# Patient Record
Sex: MALE | Race: BLACK OR AFRICAN AMERICAN
[De-identification: names, ages, dates, MRNs, and addresses within clinical notes are randomized per-mention and may not be internally consistent; named-entity substitution may affect disease eponyms.]

---

## 2021-05-09 ENCOUNTER — HOSPITAL ENCOUNTER (INPATIENT)
Dept: HOSPITAL 92 - ERS | Age: 46
LOS: 3 days | Discharge: HOME | DRG: 69 | End: 2021-05-12
Attending: INTERNAL MEDICINE | Admitting: INTERNAL MEDICINE
Payer: COMMERCIAL

## 2021-05-09 VITALS — BODY MASS INDEX: 33.2 KG/M2

## 2021-05-09 DIAGNOSIS — R29.810: ICD-10-CM

## 2021-05-09 DIAGNOSIS — I10: ICD-10-CM

## 2021-05-09 DIAGNOSIS — G40.409: ICD-10-CM

## 2021-05-09 DIAGNOSIS — R47.1: ICD-10-CM

## 2021-05-09 DIAGNOSIS — G89.29: ICD-10-CM

## 2021-05-09 DIAGNOSIS — Z20.822: ICD-10-CM

## 2021-05-09 DIAGNOSIS — G81.91: ICD-10-CM

## 2021-05-09 DIAGNOSIS — E78.5: ICD-10-CM

## 2021-05-09 DIAGNOSIS — E66.9: ICD-10-CM

## 2021-05-09 DIAGNOSIS — R47.81: ICD-10-CM

## 2021-05-09 DIAGNOSIS — Z79.899: ICD-10-CM

## 2021-05-09 DIAGNOSIS — G45.9: Primary | ICD-10-CM

## 2021-05-09 LAB
ALBUMIN SERPL BCG-MCNC: 4.2 G/DL (ref 3.5–5)
ALP SERPL-CCNC: 61 U/L (ref 40–110)
ALT SERPL W P-5'-P-CCNC: 21 U/L (ref 8–55)
ANION GAP SERPL CALC-SCNC: 23 MMOL/L (ref 10–20)
APTT PPP: 28.1 SEC (ref 22.9–36.1)
AST SERPL-CCNC: 32 U/L (ref 5–34)
BILIRUB SERPL-MCNC: 0.3 MG/DL (ref 0.2–1.2)
BUN SERPL-MCNC: 15 MG/DL (ref 8.9–20.6)
CALCIUM SERPL-MCNC: 8.9 MG/DL (ref 7.8–10.44)
CHLORIDE SERPL-SCNC: 106 MMOL/L (ref 98–107)
CO2 SERPL-SCNC: 12 MMOL/L (ref 22–29)
CREAT CL PREDICTED SERPL C-G-VRATE: 0 ML/MIN (ref 70–130)
GLOBULIN SER CALC-MCNC: 3.6 G/DL (ref 2.4–3.5)
GLUCOSE SERPL-MCNC: 99 MG/DL (ref 70–105)
HGB BLD-MCNC: 13.9 G/DL (ref 14–18)
INR PPP: 1
MCH RBC QN AUTO: 31.3 PG (ref 27–31)
MCV RBC AUTO: 91.1 FL (ref 78–98)
MDIFF COMPLETE?: YES
PLATELET # BLD AUTO: 183 THOU/UL (ref 130–400)
POTASSIUM SERPL-SCNC: 4.6 MMOL/L (ref 3.5–5.1)
PROTHROMBIN TIME: 13.5 SEC (ref 12–14.7)
RBC # BLD AUTO: 4.44 MILL/UL (ref 4.7–6.1)
SODIUM SERPL-SCNC: 136 MMOL/L (ref 136–145)
WBC # BLD AUTO: 8.6 THOU/UL (ref 4.8–10.8)

## 2021-05-09 PROCEDURE — 85300 ANTITHROMBIN III ACTIVITY: CPT

## 2021-05-09 PROCEDURE — 86038 ANTINUCLEAR ANTIBODIES: CPT

## 2021-05-09 PROCEDURE — 95957 EEG DIGITAL ANALYSIS: CPT

## 2021-05-09 PROCEDURE — 85598 HEXAGNAL PHOSPH PLTLT NEUTRL: CPT

## 2021-05-09 PROCEDURE — 70498 CT ANGIOGRAPHY NECK: CPT

## 2021-05-09 PROCEDURE — U0002 COVID-19 LAB TEST NON-CDC: HCPCS

## 2021-05-09 PROCEDURE — 70551 MRI BRAIN STEM W/O DYE: CPT

## 2021-05-09 PROCEDURE — 85379 FIBRIN DEGRADATION QUANT: CPT

## 2021-05-09 PROCEDURE — 71045 X-RAY EXAM CHEST 1 VIEW: CPT

## 2021-05-09 PROCEDURE — 80053 COMPREHEN METABOLIC PANEL: CPT

## 2021-05-09 PROCEDURE — 86147 CARDIOLIPIN ANTIBODY EA IG: CPT

## 2021-05-09 PROCEDURE — 83090 ASSAY OF HOMOCYSTEINE: CPT

## 2021-05-09 PROCEDURE — 95819 EEG AWAKE AND ASLEEP: CPT

## 2021-05-09 PROCEDURE — 85730 THROMBOPLASTIN TIME PARTIAL: CPT

## 2021-05-09 PROCEDURE — 85307 ASSAY ACTIVATED PROTEIN C: CPT

## 2021-05-09 PROCEDURE — 93005 ELECTROCARDIOGRAM TRACING: CPT

## 2021-05-09 PROCEDURE — 36415 COLL VENOUS BLD VENIPUNCTURE: CPT

## 2021-05-09 PROCEDURE — 95712 VEEG 2-12 HR INTMT MNTR: CPT

## 2021-05-09 PROCEDURE — 96374 THER/PROPH/DIAG INJ IV PUSH: CPT

## 2021-05-09 PROCEDURE — 85610 PROTHROMBIN TIME: CPT

## 2021-05-09 PROCEDURE — 93306 TTE W/DOPPLER COMPLETE: CPT

## 2021-05-09 PROCEDURE — 80048 BASIC METABOLIC PNL TOTAL CA: CPT

## 2021-05-09 PROCEDURE — 80061 LIPID PANEL: CPT

## 2021-05-09 PROCEDURE — 86780 TREPONEMA PALLIDUM: CPT

## 2021-05-09 PROCEDURE — 80306 DRUG TEST PRSMV INSTRMNT: CPT

## 2021-05-09 PROCEDURE — 85305 CLOT INHIBIT PROT S TOTAL: CPT

## 2021-05-09 PROCEDURE — 85240 CLOT FACTOR VIII AHG 1 STAGE: CPT

## 2021-05-09 PROCEDURE — 70450 CT HEAD/BRAIN W/O DYE: CPT

## 2021-05-09 PROCEDURE — 85025 COMPLETE CBC W/AUTO DIFF WBC: CPT

## 2021-05-09 PROCEDURE — 85303 CLOT INHIBIT PROT C ACTIVITY: CPT

## 2021-05-09 PROCEDURE — 70496 CT ANGIOGRAPHY HEAD: CPT

## 2021-05-09 PROCEDURE — 36416 COLLJ CAPILLARY BLOOD SPEC: CPT

## 2021-05-09 PROCEDURE — 86225 DNA ANTIBODY NATIVE: CPT

## 2021-05-09 PROCEDURE — 85652 RBC SED RATE AUTOMATED: CPT

## 2021-05-10 LAB
ANION GAP SERPL CALC-SCNC: 13 MMOL/L (ref 10–20)
APTT PPP: 30.9 SEC (ref 22.9–36.1)
BASOPHILS # BLD AUTO: 0.1 THOU/UL (ref 0–0.2)
BASOPHILS NFR BLD AUTO: 0.8 % (ref 0–1)
BUN SERPL-MCNC: 13 MG/DL (ref 8.9–20.6)
CALCIUM SERPL-MCNC: 9.3 MG/DL (ref 7.8–10.44)
CHD RISK SERPL-RTO: 3.1 (ref ?–4.5)
CHLORIDE SERPL-SCNC: 102 MMOL/L (ref 98–107)
CHOLEST SERPL-MCNC: 132 MG/DL
CO2 SERPL-SCNC: 22 MMOL/L (ref 22–29)
CREAT CL PREDICTED SERPL C-G-VRATE: 152 ML/MIN (ref 70–130)
D DIMER PPP FEU-MCNC: 0.36 *MCG/ML (ref 0.27–0.43)
DRUG SCREEN CUTOFF: (no result)
EOSINOPHIL # BLD AUTO: 0 THOU/UL (ref 0–0.7)
EOSINOPHIL NFR BLD AUTO: 0.3 % (ref 0–10)
GLUCOSE SERPL-MCNC: 123 MG/DL (ref 70–105)
HDLC SERPL-MCNC: 43 MG/DL
HGB BLD-MCNC: 13.8 G/DL (ref 14–18)
INR PPP: 1
INR PPP: 1
LDLC SERPL CALC-MCNC: 76 MG/DL
LYMPHOCYTES # BLD: 2.3 THOU/UL (ref 1.2–3.4)
LYMPHOCYTES NFR BLD AUTO: 21.8 % (ref 21–51)
MCH RBC QN AUTO: 31 PG (ref 27–31)
MCV RBC AUTO: 89.5 FL (ref 78–98)
MEDTOX CONTROL LINE VALID?: (no result)
MEDTOX READER #: (no result)
MONOCYTES # BLD AUTO: 0.6 THOU/UL (ref 0.11–0.59)
MONOCYTES NFR BLD AUTO: 5.2 % (ref 0–10)
NEUTROPHILS # BLD AUTO: 7.6 THOU/UL (ref 1.4–6.5)
NEUTROPHILS NFR BLD AUTO: 71.9 % (ref 42–75)
PLATELET # BLD AUTO: 191 THOU/UL (ref 130–400)
POTASSIUM SERPL-SCNC: 3.7 MMOL/L (ref 3.5–5.1)
PROTHROMBIN TIME: 13.4 SEC (ref 12–14.7)
PROTHROMBIN TIME: 13.5 SEC (ref 12–14.7)
RBC # BLD AUTO: 4.46 MILL/UL (ref 4.7–6.1)
SODIUM SERPL-SCNC: 133 MMOL/L (ref 136–145)
SYPHILIS ANTIBODY INDEX: 0.04 S/CO
TRIGL SERPL-MCNC: 65 MG/DL (ref ?–150)
WBC # BLD AUTO: 10.5 THOU/UL (ref 4.8–10.8)

## 2021-05-10 RX ADMIN — LEVETIRACETAM SCH MLS: 10 INJECTION INTRAVENOUS at 11:50

## 2021-05-10 RX ADMIN — LEVETIRACETAM SCH MLS: 10 INJECTION INTRAVENOUS at 19:52

## 2021-05-11 VITALS — DIASTOLIC BLOOD PRESSURE: 69 MMHG | SYSTOLIC BLOOD PRESSURE: 126 MMHG

## 2021-05-11 RX ADMIN — LEVETIRACETAM SCH MLS: 10 INJECTION INTRAVENOUS at 08:32

## 2021-05-11 RX ADMIN — LEVETIRACETAM SCH MLS: 10 INJECTION INTRAVENOUS at 21:52

## 2021-05-12 VITALS — TEMPERATURE: 97.8 F

## 2021-05-12 RX ADMIN — LEVETIRACETAM SCH MLS: 10 INJECTION INTRAVENOUS at 10:03

## 2021-05-12 RX ADMIN — LEVETIRACETAM SCH: 10 INJECTION INTRAVENOUS at 09:53

## 2021-05-13 LAB
AT III ACT/NOR PPP CHRO: 105 % ACTIVE (ref 85–130)
FACT VIII ACT/NOR PPP: 169.5 % ACTIVE (ref 56–157)

## 2021-05-14 LAB
ANA SYMPHONY (QUANTITATIVE): 0.2 RATIO
APTT HEX PL PPP: 2.1 SEC (ref 0–8)
DSDNA IGG SERPL IA-ACNC: 0.7 IU/ML
DSDNA INTERPRETATION: (no result)
ELIA APS NEW METHOD: (no result)
ELIA CARD IGA 14-20 INTERP: (no result)
ELIA CARD IGG/M 10-40 INTERP: (no result)
HEX PHOS LA TUBE 1: 43.5 SEC
HEX PHOS LA TUBE 2: 41.5 SEC

## 2023-01-19 ENCOUNTER — HOSPITAL ENCOUNTER (OUTPATIENT)
Dept: TELEMEDICINE | Facility: HOSPITAL | Age: 48
Discharge: HOME | End: 2023-01-19
Payer: MEDICARE

## 2023-01-19 PROCEDURE — G0425 INPT/ED TELECONSULT30: HCPCS | Mod: 95,,, | Performed by: PSYCHIATRY & NEUROLOGY

## 2023-01-19 PROCEDURE — G0425 PR INPT TELEHEALTH CONSULT 30M: ICD-10-PCS | Mod: 95,,, | Performed by: PSYCHIATRY & NEUROLOGY

## 2023-01-19 NOTE — SUBJECTIVE & OBJECTIVE
Woke up with symptoms?: yes    Recent bleeding noted: no  Does the patient take any Blood Thinners? no  Medications: Unknown      Past Medical History: stroke and seizures    Past Surgical History: no major surgeries within the last 2 weeks    Family History: no relevant history    Social History: unable to obtain    Allergies: Allergies have not been reviewed     Review of Systems   Constitutional: Negative for chills, diaphoresis and fever.   HENT: Negative for hearing loss, tinnitus and trouble swallowing.    Eyes: Negative for visual disturbance.   Respiratory: Negative for shortness of breath.    Cardiovascular: Negative for chest pain and palpitations.   Gastrointestinal: Negative for blood in stool and vomiting.   Endocrine: Negative for cold intolerance.   Genitourinary: Negative for hematuria.   Musculoskeletal: Negative for neck pain.   Allergic/Immunologic: Negative for immunocompromised state.   Neurological: Positive for facial asymmetry, speech difficulty and weakness. Negative for dizziness and numbness.   Hematological: Does not bruise/bleed easily.   Psychiatric/Behavioral: Positive for confusion. Negative for agitation and behavioral problems.     Objective:   Vitals: There were no vitals taken for this visit.     CT READ: Yes  No hemmorhage. No mass effect. No early infarct signs.     Physical Exam  Vitals and nursing note reviewed.   Constitutional:       General: He is not in acute distress.     Appearance: Normal appearance. He is obese.   HENT:      Head: Normocephalic and atraumatic.      Nose: Nose normal.   Eyes:      Extraocular Movements: Extraocular movements intact.   Cardiovascular:      Rate and Rhythm: Normal rate and regular rhythm.   Pulmonary:      Effort: No respiratory distress.   Abdominal:      General: There is no distension.   Genitourinary:     Comments: na  Musculoskeletal:      Cervical back: Normal range of motion.      Right lower leg: No edema.      Left lower leg:  No edema.   Skin:     Findings: No erythema or rash.   Neurological:      Mental Status: He is alert. He is disoriented.      Cranial Nerves: Cranial nerve deficit present.      Sensory: No sensory deficit.      Motor: No weakness.      Coordination: Coordination normal.   Psychiatric:         Mood and Affect: Mood normal.         Behavior: Behavior normal.

## 2023-01-19 NOTE — CONSULTS
[facility]  Vascular Neurology  Comprehensive Stroke Center  TeleVascular Neurology Acute Consultation Note      Consults    Consulting Provider: WILLIAMS MCARTHUR III  Current Providers  No providers found    Patient Location: [facility] Emergency Department  [facility] hospital nurse at bedside with patient assisting consultant.     Patient information was obtained from patient, past medical records, and primary team.         Assessment/Plan:   46 y/o with obesity, large L KO territory infarct couple years ago with residual R sided weakness and seizures, brought in due to acute onset difficulty speaking.  NIHSS 5, CT head without acute abnormality.  Concern for acute L MCA territory infarct. Out of the window for treatment with TNK.  Recommended STAT CTA brain and neck searching for LVO and potential eligibility for EVT.  If no LVO found, stay at [facility] facility and complete stroke work up with MRI brain, TTE, lipid panel, hemoglobin A1c.  Neurology, PT/OT and speech therapy consults.    Diagnoses: acute expressive aphasia.  No new Assessment & Plan notes have been filed under this hospital service since the last note was generated.  Service: Vascular Neurology      STROKE DOCUMENTATION     Acute Stroke Times:   Acute Stroke Times   Last Known Normal Date: 01/18/23  Unknown Normal Time: Unknown Time  Symptom Onset Date: 01/19/23  Unknown Symptom Onset Time: Unknown Time  Stroke Team Called Date: 01/19/23  Stroke Team Called Time: 1305  Stroke Team Arrival Date: 01/19/23  Stroke Team Arrival Time: 1315  CT Interpretation Time: 1315  Thrombolytic Therapy Recommended: No  Thrombectomy Recommended:  (Pending CTA)    NIH Scale:  Interval: baseline  1a. Level of Consciousness: 0-->Alert, keenly responsive  1b. LOC Questions: 1-->Answers one question correctly  1c. LOC Commands: 0-->Performs both tasks correctly  2. Best Gaze:  "0-->Normal  3. Visual: 0-->No visual loss  4. Facial Palsy: 2-->Partial paralysis (total or near-total paralysis of lower face)  5a. Motor Arm, Left: 0-->No drift, limb holds 90 (or 45) degrees for full 10 secs  5b. Motor Arm, Right: 0-->No drift, limb holds 90 (or 45) degrees for full 10 secs  6a. Motor Leg, Left: 0-->No drift, leg holds 30 degree position for full 5 secs  6b. Motor Leg, Right: 0-->No drift, leg holds 30 degree position for full 5 secs  7. Limb Ataxia: 0-->Absent  8. Sensory: 0-->Normal, no sensory loss  9. Best Language: 2-->Severe aphasia, all communication is through fragmentary expression, great need for inference, questioning, and guessing by the listener. Range of information that can be exchanged is limited, listener carries burden of. . . (see row details)  10. Dysarthria: 0-->Normal  11. Extinction and Inattention (formerly Neglect): 0-->No abnormality  Total (NIH Stroke Scale): 5     Modified [place] Score: 2  [place] Coma Scale:14   ABCD2 Score:    [id number] Score:   HAS -BLED Score:   ICH Score:   Hunt & Alvares Classification:       There were no vitals taken for this visit.  Eligible for thrombolytic therapy?: No  Thrombolytic therapy recomended: Thrombolytic therapy not recommended due to Outside of treatment window   Possible Interventional Revascularization Candidate? Awaiting CTA results from [facility] for determination     Disposition Recommendation: pending further studies    Subjective:     History of Present Illness: 48 y/o with obesity, large L KO territory infarct couple years ago with residual R sided weakness and seizures, brought in due to acute onset difficulty speaking. Apparently never had similar symptoms before.  Said that he was fine when he went to bed last night, woke up having " some issues talking", visited a friend and sustained a fall and worsening of his ability to talk and ambulance was called.  Some improvement.        No notes on file      Woke up with " symptoms?: yes    Recent bleeding noted: no  Does the patient take any Blood Thinners? no  Medications: Unknown      Past Medical History: stroke and seizures    Past Surgical History: no major surgeries within the last 2 weeks    Family History: no relevant history    Social History: unable to obtain    Allergies: Allergies have not been reviewed     Review of Systems   Constitutional: Negative for chills, diaphoresis and fever.   HENT: Negative for hearing loss, tinnitus and trouble swallowing.    Eyes: Negative for visual disturbance.   Respiratory: Negative for shortness of breath.    Cardiovascular: Negative for chest pain and palpitations.   Gastrointestinal: Negative for blood in stool and vomiting.   Endocrine: Negative for cold intolerance.   Genitourinary: Negative for hematuria.   Musculoskeletal: Negative for neck pain.   Allergic/Immunologic: Negative for immunocompromised state.   Neurological: Positive for facial asymmetry, speech difficulty and weakness. Negative for dizziness and numbness.   Hematological: Does not bruise/bleed easily.   Psychiatric/Behavioral: Positive for confusion. Negative for agitation and behavioral problems.     Objective:   Vitals: There were no vitals taken for this visit.     CT READ: Yes  No hemmorhage. No mass effect. No early infarct signs.     Physical Exam  Vitals and nursing note reviewed.   Constitutional:       General: He is not in acute distress.     Appearance: Normal appearance. He is obese.   HENT:      Head: Normocephalic and atraumatic.      Nose: Nose normal.   Eyes:      Extraocular Movements: Extraocular movements intact.   Cardiovascular:      Rate and Rhythm: Normal rate and regular rhythm.   Pulmonary:      Effort: No respiratory distress.   Abdominal:      General: There is no distension.   Genitourinary:     Comments: na  Musculoskeletal:      Cervical back: Normal range of motion.      Right lower leg: No edema.      Left lower leg: No edema.    Skin:     Findings: No erythema or rash.   Neurological:      Mental Status: He is alert. He is disoriented.      Cranial Nerves: Cranial nerve deficit present.      Sensory: No sensory deficit.      Motor: No weakness.      Coordination: Coordination normal.   Psychiatric:         Mood and Affect: Mood normal.         Behavior: Behavior normal.             Recommended the emergency room physician to have a brief discussion with the patient and/or family if available regarding the  risks and benefits of treatment, and to briefly document the occurrence of that discussion in his clinical encounter note.     The attending portion of this evaluation, treatment, and documentation was performed per Cole Nava MD via audiovisual.    Billing code:  (non-intervention mild to moderate stroke, TIA, some mimics)      This patient has a critical neurological condition/illness, with some potential for high morbidity and mortality.  There is a moderate probability for acute neurological change leading to clinical and possibly life-threatening deterioration requiring highest level of physician preparedness for urgent intervention.  Care was coordinated with other physicians involved in the patient's care.  Radiologic studies and laboratory data were reviewed and interpreted, and plan of care was re-assessed based on the results.  Diagnosis, treatment options and prognosis may have been discussed with the patient and/or family members or caregiver.    In your opinion, this was a: Tier 2 Van Positive    Consult End Time: 1:28 PM     Cole Nava MD  [facility]  Vascular Neurology   [facility]

## 2023-09-01 ENCOUNTER — HOSPITAL ENCOUNTER (OUTPATIENT)
Dept: TELEMEDICINE | Facility: HOSPITAL | Age: 48
Discharge: HOME | End: 2023-09-01
Payer: MEDICARE

## 2023-09-01 DIAGNOSIS — F80.81 STUTTERING: ICD-10-CM

## 2023-09-01 PROBLEM — R42 DIZZINESS: Status: ACTIVE | Noted: 2023-09-01

## 2023-09-01 PROCEDURE — G0426 PR INPT TELEHEALTH CONSULT 50M: ICD-10-PCS | Mod: 95,,, | Performed by: PSYCHIATRY & NEUROLOGY

## 2023-09-01 PROCEDURE — G0426 INPT/ED TELECONSULT50: HCPCS | Mod: 95,,, | Performed by: PSYCHIATRY & NEUROLOGY

## 2023-09-01 NOTE — ASSESSMENT & PLAN NOTE
Mild reduction in fluency with stuttering on most verbal output however it is 100% completed and 100% accurate. No other signs of speech dysfunction noted. Chronic R LE weakness noted as well from remote left KO stroke.  Patient already taking asa/plavix and would continue those.  Given the isolated stuttering which may represent an mild aphasia, the symptoms are mild and nondisabling and thus would not warrant the risk of thrombolytic therapy at this time. DDx includes recrudescence.  Recommend:  - CTA H&N (or MRA H&N) for vessel imaging ASAP  to r/o high risk attributable lesion for further risk stratification and mgmt  - MRI brain w/o contrast to evaluate for presence and characterization of infarct  - TTE (if < 60 should add bubble study)  - lipid profile and A1c for evaluation of modifiable risk factors  - PT/OT/SLP eval and Rx  - Permissive HTN < 220/120 mmHg x48-72h pending results of vessel imaging

## 2023-09-01 NOTE — CONSULTS
[facility]  Vascular Neurology  Comprehensive Stroke Center  TeleVascular Neurology Acute Consultation Note      Consults    Consulting Provider: JOHNNY LUIS  Current Providers  No providers found    Patient Location: [facility] Emergency Department  [facility] hospital nurse at bedside with patient assisting consultant.     Patient information was obtained from patient.         Assessment/Plan:       Diagnoses:   Neuro  Stuttering  Mild reduction in fluency with stuttering on most verbal output however it is 100% completed and 100% accurate. No other signs of speech dysfunction noted. Chronic R LE weakness noted as well from remote left KO stroke.  Patient already taking asa/plavix and would continue those.  Given the isolated stuttering which may represent an mild aphasia, the symptoms are mild and nondisabling and thus would not warrant the risk of thrombolytic therapy at this time. DDx includes recrudescence.  Recommend:  - CTA H&N (or MRA H&N) for vessel imaging ASAP  to r/o high risk attributable lesion for further risk stratification and mgmt  - MRI brain w/o contrast to evaluate for presence and characterization of infarct  - TTE (if < 60 should add bubble study)  - lipid profile and A1c for evaluation of modifiable risk factors  - PT/OT/SLP eval and Rx  - Permissive HTN < 220/120 mmHg x48-72h pending results of vessel imaging          STROKE DOCUMENTATION     Acute Stroke Times:   Acute Stroke Times   Last Known Normal Time: 1329  Stroke Team Called Time: 1348  Stroke Team Arrival Time: 1356  CT Interpretation Time: 1356    NIH Scale:  1a. Level of Consciousness: 0-->Alert, keenly responsive  1b. LOC Questions: 0-->Answers both questions correctly  1c. LOC Commands: 0-->Performs both tasks correctly  2. Best Gaze: 0-->Normal  3. Visual: 0-->No visual loss  4. Facial Palsy: 0-->Normal symmetrical movements  5a. Motor  Arm, Left: 0-->No drift, limb holds 90 (or 45) degrees for full 10 secs  5b. Motor Arm, Right: 1-->Drift, limb holds 90 (or 45) degrees, but drifts down before full 10 secs, does not hit bed or other support (baseline)  6a. Motor Leg, Left: 0-->No drift, leg holds 30 degree position for full 5 secs  6b. Motor Leg, Right: 0-->No drift, leg holds 30 degree position for full 5 secs  7. Limb Ataxia: 0-->Absent  8. Sensory: 0-->Normal, no sensory loss  9. Best Language: 1-->Mild-to-moderate aphasia, some obvious loss of fluency or facility of comprehension, without significant limitation on ideas expressed or form of expression. Reduction of speech and/or comprehension, however, makes conversation. . . (see row details)  10. Dysarthria: 0-->Normal  11. Extinction and Inattention (formerly Neglect): 0-->No abnormality  Total (NIH Stroke Scale): 2     Modified [place]    Corey Coma Scale:    ABCD2 Score:    [id number] Score:   HAS -BLED Score:   ICH Score:   Hunt & Alvares Classification:       There were no vitals taken for this visit.  Eligible for thrombolytic therapy?: No  Thrombolytic therapy recomended: Thrombolytic therapy not recommended due to Mild Non-Disabling Symptoms  Possible Interventional Revascularization Candidate? No; at this time symptoms not suggestive of large vessel occlusion    Disposition Recommendation: transfer to nearest appropriate facility     Subjective:     History of Present Illness:  47M was feeling bad went to see his PCP, blood pressure elevated, sent to the ED , started stuttering on way to the ED and these are similar symptoms to prior stroke.   Had a seizure last week for 2 years.  Imaging personally interpreted:  CT Brain: no evidence of early or subacute ischemic changes, intracerebral hemorrhage or hyperdense vessel signs; remote left KO stroke  CTA Head & Neck:  Not performed    /89 mmHg. Baseline R LE weakness from prior stroke.    No new subjective & objective note has  been filed under this hospital service since the last note was generated.      Recommended the emergency room physician to have a brief discussion with the patient and/or family if available regarding the  risks and benefits of treatment, and to briefly document the occurrence of that discussion in his clinical encounter note.     The attending portion of this evaluation, treatment, and documentation was performed per Ryan Pierson MD via audiovisual.    Billing code:  (non-intervention mild to moderate stroke, TIA, some mimics)      This patient has a critical neurological condition/illness, with some potential for high morbidity and mortality.  There is a moderate probability for acute neurological change leading to clinical and possibly life-threatening deterioration requiring highest level of physician preparedness for urgent intervention.  Care was coordinated with other physicians involved in the patient's care.  Radiologic studies and laboratory data were reviewed and interpreted, and plan of care was re-assessed based on the results.  Diagnosis, treatment options and prognosis may have been discussed with the patient and/or family members or caregiver.    In your opinion, this was a: Tier 1 Van Negative    Consult End Time: 2:09 PM     Ryan Pierson MD  [facility]  Vascular Neurology   [facility]

## 2023-09-01 NOTE — HPI
47M was feeling bad went to see his PCP, blood pressure elevated, sent to the ED , started stuttering on way to the ED and these are similar symptoms to prior stroke.   Had a seizure last week for 2 years.  Imaging personally interpreted:  CT Brain: no evidence of early or subacute ischemic changes, intracerebral hemorrhage or hyperdense vessel signs; remote left KO stroke  CTA Head & Neck:  Not performed    /89 mmHg. Baseline R LE weakness from prior stroke.

## 2023-09-02 PROBLEM — R29.898 RIGHT LEG WEAKNESS: Status: ACTIVE | Noted: 2023-09-02

## 2023-09-02 PROBLEM — I63.9 CEREBROVASCULAR ACCIDENT (CVA): Status: ACTIVE | Noted: 2023-09-02

## 2023-12-04 PROBLEM — I63.9 CEREBROVASCULAR ACCIDENT (CVA): Status: RESOLVED | Noted: 2023-09-02 | Resolved: 2023-12-04
